# Patient Record
Sex: MALE | Race: WHITE | NOT HISPANIC OR LATINO | Employment: FULL TIME | URBAN - METROPOLITAN AREA
[De-identification: names, ages, dates, MRNs, and addresses within clinical notes are randomized per-mention and may not be internally consistent; named-entity substitution may affect disease eponyms.]

---

## 2017-03-16 ENCOUNTER — HOSPITAL ENCOUNTER (EMERGENCY)
Facility: MEDICAL CENTER | Age: 37
End: 2017-03-16
Attending: EMERGENCY MEDICINE
Payer: COMMERCIAL

## 2017-03-16 ENCOUNTER — APPOINTMENT (OUTPATIENT)
Dept: RADIOLOGY | Facility: MEDICAL CENTER | Age: 37
End: 2017-03-16
Attending: EMERGENCY MEDICINE
Payer: COMMERCIAL

## 2017-03-16 VITALS
HEART RATE: 77 BPM | HEIGHT: 69 IN | SYSTOLIC BLOOD PRESSURE: 132 MMHG | BODY MASS INDEX: 31.1 KG/M2 | WEIGHT: 210 LBS | RESPIRATION RATE: 10 BRPM | OXYGEN SATURATION: 97 % | DIASTOLIC BLOOD PRESSURE: 78 MMHG

## 2017-03-16 DIAGNOSIS — R55 VASOVAGAL SYNCOPE: ICD-10-CM

## 2017-03-16 LAB
ALBUMIN SERPL BCP-MCNC: 4.6 G/DL (ref 3.2–4.9)
ALBUMIN/GLOB SERPL: 1.5 G/DL
ALP SERPL-CCNC: 100 U/L (ref 30–99)
ALT SERPL-CCNC: 14 U/L (ref 2–50)
AMPHET UR QL SCN: NEGATIVE
ANION GAP SERPL CALC-SCNC: 12 MMOL/L (ref 0–11.9)
AST SERPL-CCNC: 14 U/L (ref 12–45)
BARBITURATES UR QL SCN: NEGATIVE
BASOPHILS # BLD AUTO: 0.6 % (ref 0–1.8)
BASOPHILS # BLD: 0.06 K/UL (ref 0–0.12)
BENZODIAZ UR QL SCN: NEGATIVE
BILIRUB SERPL-MCNC: 1.4 MG/DL (ref 0.1–1.5)
BNP SERPL-MCNC: 14 PG/ML (ref 0–100)
BUN SERPL-MCNC: 25 MG/DL (ref 8–22)
BZE UR QL SCN: NEGATIVE
CALCIUM SERPL-MCNC: 9.6 MG/DL (ref 8.5–10.5)
CANNABINOIDS UR QL SCN: POSITIVE
CHLORIDE SERPL-SCNC: 105 MMOL/L (ref 96–112)
CO2 SERPL-SCNC: 19 MMOL/L (ref 20–33)
CREAT SERPL-MCNC: 0.85 MG/DL (ref 0.5–1.4)
DEPRECATED D DIMER PPP IA-ACNC: <200 NG/ML(D-DU)
EOSINOPHIL # BLD AUTO: 0.16 K/UL (ref 0–0.51)
EOSINOPHIL NFR BLD: 1.7 % (ref 0–6.9)
ERYTHROCYTE [DISTWIDTH] IN BLOOD BY AUTOMATED COUNT: 38.6 FL (ref 35.9–50)
GFR SERPL CREATININE-BSD FRML MDRD: >60 ML/MIN/1.73 M 2
GLOBULIN SER CALC-MCNC: 3.1 G/DL (ref 1.9–3.5)
GLUCOSE SERPL-MCNC: 224 MG/DL (ref 65–99)
HCT VFR BLD AUTO: 42.5 % (ref 42–52)
HGB BLD-MCNC: 14.3 G/DL (ref 14–18)
IMM GRANULOCYTES # BLD AUTO: 0.04 K/UL (ref 0–0.11)
IMM GRANULOCYTES NFR BLD AUTO: 0.4 % (ref 0–0.9)
LYMPHOCYTES # BLD AUTO: 2.44 K/UL (ref 1–4.8)
LYMPHOCYTES NFR BLD: 26.1 % (ref 22–41)
MCH RBC QN AUTO: 28.7 PG (ref 27–33)
MCHC RBC AUTO-ENTMCNC: 33.6 G/DL (ref 33.7–35.3)
MCV RBC AUTO: 85.3 FL (ref 81.4–97.8)
MDMA UR QL SCN: NEGATIVE
METHADONE UR QL SCN: NEGATIVE
MONOCYTES # BLD AUTO: 0.46 K/UL (ref 0–0.85)
MONOCYTES NFR BLD AUTO: 4.9 % (ref 0–13.4)
NEUTROPHILS # BLD AUTO: 6.2 K/UL (ref 1.82–7.42)
NEUTROPHILS NFR BLD: 66.3 % (ref 44–72)
NRBC # BLD AUTO: 0 K/UL
NRBC BLD AUTO-RTO: 0 /100 WBC
OPIATES UR QL SCN: NEGATIVE
OXYCODONE UR QL SCN: NEGATIVE
PCP UR QL SCN: NEGATIVE
PLATELET # BLD AUTO: 222 K/UL (ref 164–446)
PMV BLD AUTO: 9.8 FL (ref 9–12.9)
POTASSIUM SERPL-SCNC: 3.4 MMOL/L (ref 3.6–5.5)
PROPOXYPH UR QL SCN: NEGATIVE
PROT SERPL-MCNC: 7.7 G/DL (ref 6–8.2)
RBC # BLD AUTO: 4.98 M/UL (ref 4.7–6.1)
SODIUM SERPL-SCNC: 136 MMOL/L (ref 135–145)
T4 FREE SERPL-MCNC: 1 NG/DL (ref 0.53–1.43)
TROPONIN I SERPL-MCNC: <0.01 NG/ML (ref 0–0.04)
WBC # BLD AUTO: 9.4 K/UL (ref 4.8–10.8)

## 2017-03-16 PROCEDURE — 93005 ELECTROCARDIOGRAM TRACING: CPT | Performed by: EMERGENCY MEDICINE

## 2017-03-16 PROCEDURE — 96360 HYDRATION IV INFUSION INIT: CPT

## 2017-03-16 PROCEDURE — 80053 COMPREHEN METABOLIC PANEL: CPT

## 2017-03-16 PROCEDURE — 700105 HCHG RX REV CODE 258: Performed by: EMERGENCY MEDICINE

## 2017-03-16 PROCEDURE — 304562 HCHG STAT O2 MASK/CANNULA

## 2017-03-16 PROCEDURE — 36415 COLL VENOUS BLD VENIPUNCTURE: CPT

## 2017-03-16 PROCEDURE — 96361 HYDRATE IV INFUSION ADD-ON: CPT

## 2017-03-16 PROCEDURE — 84439 ASSAY OF FREE THYROXINE: CPT

## 2017-03-16 PROCEDURE — 304561 HCHG STAT O2

## 2017-03-16 PROCEDURE — 71010 DX-CHEST-PORTABLE (1 VIEW): CPT

## 2017-03-16 PROCEDURE — 84484 ASSAY OF TROPONIN QUANT: CPT

## 2017-03-16 PROCEDURE — 85379 FIBRIN DEGRADATION QUANT: CPT

## 2017-03-16 PROCEDURE — 85025 COMPLETE CBC W/AUTO DIFF WBC: CPT

## 2017-03-16 PROCEDURE — 80307 DRUG TEST PRSMV CHEM ANLYZR: CPT

## 2017-03-16 PROCEDURE — 99284 EMERGENCY DEPT VISIT MOD MDM: CPT

## 2017-03-16 PROCEDURE — 83880 ASSAY OF NATRIURETIC PEPTIDE: CPT

## 2017-03-16 RX ORDER — SODIUM CHLORIDE 9 MG/ML
1000 INJECTION, SOLUTION INTRAVENOUS ONCE
Status: COMPLETED | OUTPATIENT
Start: 2017-03-16 | End: 2017-03-16

## 2017-03-16 RX ORDER — SODIUM CHLORIDE 9 MG/ML
INJECTION, SOLUTION INTRAVENOUS ONCE
Status: COMPLETED | OUTPATIENT
Start: 2017-03-16 | End: 2017-03-16

## 2017-03-16 RX ORDER — ONDANSETRON 4 MG/1
4 TABLET, FILM COATED ORAL EVERY 4 HOURS PRN
Qty: 10 TAB | Refills: 0 | Status: SHIPPED | OUTPATIENT
Start: 2017-03-16

## 2017-03-16 RX ADMIN — SODIUM CHLORIDE 1000 ML: 9 INJECTION, SOLUTION INTRAVENOUS at 02:08

## 2017-03-16 RX ADMIN — SODIUM CHLORIDE: 9 INJECTION, SOLUTION INTRAVENOUS at 05:17

## 2017-03-16 ASSESSMENT — PAIN SCALES - GENERAL: PAINLEVEL_OUTOF10: 0

## 2017-03-16 NOTE — CONSULTS
"Cardiology New Consult Note    Date of note:    3/16/2017      Consulting Physician: Kelly Ralph MD    Patient ID:    Name:   Blanco López     YOB: 1980  Age:   37 y.o.  male   MRN:   [id number]      Consult question: Evaluation of presyncope, and sinus bradycardia prompting the use of atropine by emergency medical service    HPI:  Blanco López is a 37-year-old man with a family history of coronary artery disease, and medical marijuana use with no other medical history who presents brought in by ambulance after an event that he characterizes as presyncope though emergency medical services related was syncope. He tells me that he is quite clear that he remembers all details prior to his admission and did not pass out. He came nauseated on the plane, intensely diaphoretic, and after that had his event. He has had similar events in the past and tells me that he is intensely diaphoretic with them. He was relatively dehydrated in the setting of quite a bit of snowboarding in [place] where he lives. For tonight, cardiovascular review systems is negative.    ROS: Negative    PMH (problem list reviewed, pertinent to this consultation):     1. Family history of coronary artery disease as above    Outpatient Medications:     None    Allergies: Review of patient's allergies indicates no known allergies.    Family History: family history includes Heart Disease (age of onset: 45) in his father.    Social History:  reports that he has never smoked. He does not have any smokeless tobacco history on file. He reports that he uses illicit drugs (Inhaled). He reports that he does not drink alcohol.    Physical Exam    Body mass index is 31 kg/(m^2).  Blood pressure 132/78, pulse 77, resp. rate 15, height 1.753 m (5' 9.02\"), weight 95.255 kg (210 lb), SpO2 96 %.  Filed Vitals:    03/16/17 0446 03/16/17 0447 03/16/17 0450 03/16/17 0500   BP:       Pulse:       Resp:  18 16 15   Height:       Weight:       SpO2: 97% 98% 96% " 96%     Oxygen Therapy:  Pulse Oximetry: 96 %, O2 (LPM): 2    General: Middle-aged appearing man in no distress in the emergency room  HEENT: No jugular venous distension sitting upright, pupils equal, round and reactive to light, extraocular movements intact  Heart: Normal rate, regular rhythm, no murmurs, no rubs or gallops  Lungs: Clear to auscultation bilaterally  Abdomen: Bowel sounds positive, non tender, non distended, no masses   Extremities: No lower extremity edema, no clubbing, no cyanosis  Neurological: Alert and oriented x 3, no focal deficit. Narcissistic personality traits.  Skin: no rashes    Labs (reviewed and notable for):     CBC, 3/16/2017 unremarkable  Chemistry panel, 3/16/2017 remarkable for blood sugar of 224, normal creatinine, mild hypokalemia  Cardiac markers: Troponin was negative ×1 (less than 0.01 ng/ML), B-type natriuretic peptide 14 pg/mL    EKG: Sinus rhythm, no conduction system disease. His first EKG showed a QTC of 490 ms, a second showed a QTC of 459 ms      Impression and Medical Decision Makin. Neurocardiogenic presyncope: I informed the patient to push his hydration drinking 8 large glasses of water or more per day, and increase his salt intake. I don't suspect that her another episode will recur. I do not think that he needs additional workup at this time. I discussed with him other treatment modalities including compression garments, midodrine, Florinef, and SSRIs none of which again I would recommend at this time. I think it would be wise to finish out his liter of normal saline prior to discharge.  I do think that it would be wise to send him home with a prescription for Zofran for the treatment of his nausea for his flight back is an airline travel seems to be fairly predictable recipient end, and he did after all come here to snowboard, which caused him to be dehydrated in the past and precipitated his present event.    2. Sinus bradycardia: Treated with atropine,  secondary to #1. No conduction systems disease seen on his EKG.    No additional recommendations, will sign off.      This note was dictated using Dragon speech recognition software.    Thank you for allowing me to participate in the care of this patient.  Please call if any clarification will be helpful.      Aureliano Wheat MD  Cardiologist, [facility] [place]

## 2017-03-16 NOTE — ED AVS SNAPSHOT
LimeTray Access Code: [email or web address]  Expires: 4/15/2017  4:09 AM    Your email address is not on file at [facility].  Email Addresses are required for you to sign up for LimeTray, please contact [phone] to verify your personal information and to provide your email address prior to attempting to register for LimeTray.    Blanco López  [address] [place] [address]    LimeTray  A secure, online tool to manage your health information     [facility]’s LimeTray® is a secure, online tool that connects you to your personalized health information from the privacy of your home -- day or night - making it very easy for you to manage your healthcare. Once the activation process is completed, you can even access your medical information using the LimeTray rani, which is available for free in the Apple Rani store or Google Play store.     To learn more about LimeTray, visit [email or web address]    There are two levels of access available (as shown below):   My Chart Features  [facility] Primary Care Doctor [facility]  Specialists [facility]  Urgent  Care Non-[facility] Primary Care Doctor   Email your healthcare team securely and privately 24/7 X X X    Manage appointments: schedule your next appointment; view details of past/upcoming appointments X      Request prescription refills. X      View recent personal medical records, including lab and immunizations X X X X   View health record, including health history, allergies, medications X X X X   Read reports about your outpatient visits, procedures, consult and ER notes X X X X   See your discharge summary, which is a recap of your hospital and/or ER visit that includes your diagnosis, lab results, and care plan X X  X     How to register for [employer]:  Once your e-mail address has been verified, follow the following steps to sign up for [employer].     1. Go to  [email or web address]  2. Click on the Sign Up Now box, which takes you to the New Member Sign Up page. You will  need to provide the following information:  a. Enter your [employer] Access Code exactly as it appears at the top of this page. (You will not need to use this code after you’ve completed the sign-up process. If you do not sign up before the expiration date, you must request a new code.)   b. Enter your date of birth.   c. Enter your home email address.   d. Click Submit, and follow the next screen’s instructions.  3. Create a Guan[employer]met ID. This will be your [employer] login ID and cannot be changed, so think of one that is secure and easy to remember.  4. Create a [employer] password. You can change your password at any time.  5. Enter your Password Reset Question and Answer. This can be used at a later time if you forget your password.   6. Enter your e-mail address. This allows you to receive e-mail notifications when new information is available in [employer].  7. Click Sign Up. You can now view your health information.    For assistance activating your [employer] account, call [phone]

## 2017-03-16 NOTE — ED AVS SNAPSHOT
Home Care Instructions                                                                                                                Blanco López   MRN: [id number]    Department:  [facility], Emergency Dept   Date of Visit:  3/16/2017            [facility], Emergency Dept    [address] [place] [id number]    Phone:  [phone]      You were seen by     Kelly Ralph M.D.      Your Diagnosis Was     Vasovagal syncope     R55       These are the medications you received during your hospitalization from 03/16/2017 0135 to 03/16/2017 0409     Date/Time Order Dose Route Action    03/16/2017 0208 NS infusion 1,000 mL 1,000 mL Intravenous New Bag      Follow-up Information     1. Follow up with [facility], Emergency Dept.    Specialty:  Emergency Medicine    Why:  If symptoms worsen    Contact information    [address] [place] [id number] [phone]        2. Please follow up.    Why:  Please follow up with your primary care physician and/or cardiologist for recheck.      Medication Information     Review all of your home medications and newly ordered medications with your primary doctor and/or pharmacist as soon as possible. Follow medication instructions as directed by your doctor and/or pharmacist.     Please keep your complete medication list with you and share with your physician. Update the information when medications are discontinued, doses are changed, or new medications (including over-the-counter products) are added; and carry medication information at all times in the event of emergency situations.               Medication List      Notice     You have not been prescribed any medications.            Procedures and tests performed during your visit     BTYPE NATRIURETIC PEPTIDE    CBC WITH DIFFERENTIAL    COMP METABOLIC PANEL    Cardiac Monitoring    D-DIMER (only helpful in low pre-test probability wells critieria. Do not order  if patient ruled out by PERC criteria. See Weblinks at top of Labs section)    DX-CHEST-PORTABLE (1 VIEW)    EKG (ER)    ESTIMATED GFR    FREE THYROXINE    IV Saline Lock    Pulse Ox    TROPONIN        Discharge Instructions       Please follow-up with your cardiologist when you return home to [place]. Please return to the emergency department if he develops any new or worsening symptoms, if you develop any other lightheadedness, or if you have any further concerns.      Near-Syncope  Near-syncope (commonly known as near fainting) is sudden weakness, dizziness, or feeling like you might pass out. During an episode of near-syncope, you may also develop pale skin, have tunnel vision, or feel sick to your stomach (nauseous). Near-syncope may occur when getting up after sitting or while standing for a long time. It is caused by a sudden decrease in blood flow to the brain. This decrease can result from various causes or triggers, most of which are not serious. However, because near-syncope can sometimes be a sign of something serious, a medical evaluation is required. The specific cause is often not determined.  HOME CARE INSTRUCTIONS   Monitor your condition for any changes. The following actions may help to alleviate any discomfort you are experiencing:  · Have someone stay with you until you feel stable.  · Lie down right away and prop your feet up if you start feeling like you might faint. Breathe deeply and steadily. Wait until all the symptoms have passed. Most of these episodes last only a few minutes. You may feel tired for several hours.    · Drink enough fluids to keep your urine clear or pale yellow.    · If you are taking blood pressure or heart medicine, get up slowly when seated or lying down. Take several minutes to sit and then stand. This can reduce dizziness.  · Follow up with your health care provider as directed.   SEEK IMMEDIATE MEDICAL CARE IF:   · You have a severe headache.    · You have unusual  pain in the chest, abdomen, or back.    · You are bleeding from the mouth or rectum, or you have black or tarry stool.    · You have an irregular or very fast heartbeat.    · You have repeated fainting or have seizure-like jerking during an episode.    · You faint when sitting or lying down.    · You have confusion.    · You have difficulty walking.    · You have severe weakness.    · You have vision problems.    MAKE SURE YOU:   · Understand these instructions.  · Will watch your condition.  · Will get help right away if you are not doing well or get worse.     This information is not intended to replace advice given to you by your health care provider. Make sure you discuss any questions you have with your health care provider.     Document Released: 12/18/2006 Document Revised: 12/23/2014 Document Reviewed: 05/23/2014  [employer] Interactive Patient Education ©2016 [employer]            Patient Information     Patient Information    Following emergency treatment: all patient requiring follow-up care must return either to a private physician or a clinic if your condition worsens before you are able to obtain further medical attention, please return to the emergency room.     Billing Information    At [facility], we work to make the billing process streamlined for our patients.  Our Representatives are here to answer any questions you may have regarding your hospital bill.  If you have insurance coverage and have supplied your insurance information to us, we will submit a claim to your insurer on your behalf.  Should you have any questions regarding your bill, we can be reached online or by phone as follows:  Online: You are able pay your bills online or live chat with our representatives about any billing questions you may have. We are here to help Monday - Friday from 8:00am to 7:30pm and 9:00am - 12:00pm on Saturdays.  Please visit [email or web address]  for more information.    Phone:  [phone] or [phone]    Please note that your emergency physician, surgeon, pathologist, radiologist, anesthesiologist, and other specialists are not employed by [facility] and will therefore bill separately for their services.  Please contact them directly for any questions concerning their bills at the numbers below:     Emergency Physician Services:  [phone]  [facility]:  [phone]  [facility]:  [phone]  [facility]:  [phone]    1. Your final bill may vary from the amount quoted upon discharge if all procedures are not complete at that time, or if your doctor has additional procedures of which we are not aware. You will receive an additional bill if you return to the Emergency Department at [facility] hospital for suture removal regardless of the facility of which the sutures were placed.     2. Please arrange for settlement of this account at the emergency registration.    3. All self-pay accounts are due in full at the time of treatment.  If you are unable to meet this obligation then payment is expected within 4-5 days.     4. If you have had radiology studies (CT, X-ray, Ultrasound, MRI), you have received a preliminary result during your emergency department visit. Please contact the radiology department [phone] to receive a copy of your final result. Please discuss the Final result with your primary physician or with the follow up physician provided.     Crisis Hotline:  [place] Crisis Hotline:  7-173-GSNKEWG or 1-428.590.3346  [place] Crisis Hotline:    1-456.717.7764 or 867-958-6062         ED Discharge Follow Up Questions    1. In order to provide you with very good care, we would like to follow up with a phone call in the next few days.  May we have your permission to contact you?     YES /  NO    2. What is the best phone number to call you? (       )_____-__________    3. What is the best time to call you?       Morning  /  Afternoon  /  Evening                   Patient Signature:  ____________________________________________________________    Date:  ____________________________________________________________

## 2017-03-16 NOTE — ED NOTES
Late entry:  Pt states he felt light headed when standing and complains of a continued headache.  Orthostatics complete, ERP notified.  Pt evaluated by Cardiology.

## 2017-03-16 NOTE — ED NOTES
Pt discharged home. Explained discharge and medication instruction. Pt verbalized understanding of instructions. Pt is ambulating well and is steady on feet. VS stable.

## 2017-03-16 NOTE — ED PROVIDER NOTES
ED Provider Note    Scribed for Kelly Ralph M.D. by Darlene Mistry. 3/16/2017, 1:51 AM.    Primary care provider: No primary care provider on file.  Means of arrival: Ambulance   History obtained from: Patient   History limited by: None     CHIEF COMPLAINT  Chief Complaint   Patient presents with   • N/V   • Bradycardia       HPI  Blanco López is a 37 y.o. male who presents to the Emergency Department due to nausea and vomiting onset earlier tonight. Patient was on a direct flight from [place] when he became diaphoretic and began feeling nauseated and vomited during the landing. He then had a syncopal episode while on the plane. Patient states he lost consciousness and regained consciousness while in the ambulance. He denies chest pain or shortness of breath. Patient endorses feeling drowsy secondary to snowboarding prior to leaving [place]. He notes using medical marijuana regularly but denies further drug use. Patient has a history of anxiety.     Other history obtained by paramedics who arrived on scene. States that he had 2 syncopal episodes and markedly lightheadedness between these episodes. Heart rate was as low as 37, they did give 0.5 mg of atropine with good heart rate response. He has remained normotensive, with normal heart rate since that time.    REVIEW OF SYSTEMS  Pertinent positives include nausea, vomiting, diaphoresis, syncope, loss of consciousness. Pertinent negatives include no chest pain, shortness of breath.  All other systems reviewed and negative. C     PAST MEDICAL HISTORY   has a past medical history of Anxiety.    SURGICAL HISTORY  patient denies any surgical history    SOCIAL HISTORY  Social History   Substance Use Topics   • Smoking status: Never Smoker    • Smokeless tobacco: None   • Alcohol Use: No      History   Drug Use   • Yes   • Special: Inhaled     Comment: medical marijuana for anxiety       FAMILY HISTORY  Family History   Problem Relation Age of Onset   • Heart Disease  "Father 45     MI       CURRENT MEDICATIONS  Home Medications     Reviewed by Aranza Triplett R.N. (Registered Nurse) on 03/16/17 at 0144  Med List Status: Complete    Medication Last Dose Status          Patient Laith Taking any Medications                        ALLERGIES  No Known Allergies    PHYSICAL EXAM  Vital Signs: /78 mmHg  Pulse 77  Resp 18  Ht 1.753 m (5' 9.02\")  Wt 95.255 kg (210 lb)  BMI 31.00 kg/m2  SpO2 99%  Constitutional: Alert, no acute distress  HENT: Normocephalic, atraumatic, moist mucus membranes. Drowsy.   Eyes: Pupils equal and reactive, normal conjunctiva, non-icteric  Neck: Supple, normal range of motion, no stridor  Cardiovascular: Regular rhythm, Normal peripheral perfusion, no cyanosis, Normal cardiac auscultation  Pulmonary: No respiratory distress, normal work of breathing, no accessory muscle usage, Clear to auscultation bilaterally  Abdomen: Soft, non tender, no peritoneal signs, bowel sounds are present.   Skin: Warm, dry, no rashes or lesions  Back: No pain with active range of motion  Musculoskeletal: Normal range of motion in all extremities, no swelling or deformity noted  Neurologic: Alert, oriented, normal motor function, no speech deficits  Psychiatric: Normal and appropriate mood and affect    LABS  Labs Reviewed   CBC WITH DIFFERENTIAL - Abnormal; Notable for the following:     MCHC 33.6 (*)     All other components within normal limits    Narrative:     1. Age less then 50  2. Heart reate less then 100  3. 02 sat > 94%  4. No prior history of DVT or PE  5. No recent trauma or surgery (2 weeks)  6. No hemoptisis.  7. No  or HRP  8. No un-lateral leg swelling.   COMP METABOLIC PANEL - Abnormal; Notable for the following:     Potassium 3.4 (*)     Co2 19 (*)     Anion Gap 12.0 (*)     Glucose 224 (*)     Bun 25 (*)     Alkaline Phosphatase 100 (*)     All other components within normal limits    Narrative:     1. Age less then 50  2. Heart reate less then " 100  3. 02 sat > 94%  4. No prior history of DVT or PE  5. No recent trauma or surgery (2 weeks)  6. No hemoptisis.  7. No  or HRP  8. No un-lateral leg swelling.   URINE DRUG SCREEN - Abnormal; Notable for the following:     Cannabinoid Metab Positive (*)     All other components within normal limits   TROPONIN    Narrative:     1. Age less then 50  2. Heart reate less then 100  3. 02 sat > 94%  4. No prior history of DVT or PE  5. No recent trauma or surgery (2 weeks)  6. No hemoptisis.  7. No  or HRP  8. No un-lateral leg swelling.   BTYPE NATRIURETIC PEPTIDE    Narrative:     1. Age less then 50  2. Heart reate less then 100  3. 02 sat > 94%  4. No prior history of DVT or PE  5. No recent trauma or surgery (2 weeks)  6. No hemoptisis.  7. No  or HRP  8. No un-lateral leg swelling.   FREE THYROXINE    Narrative:     1. Age less then 50  2. Heart reate less then 100  3. 02 sat > 94%  4. No prior history of DVT or PE  5. No recent trauma or surgery (2 weeks)  6. No hemoptisis.  7. No  or HRP  8. No un-lateral leg swelling.   D-DIMER    Narrative:     1. Age less then 50  2. Heart reate less then 100  3. 02 sat > 94%  4. No prior history of DVT or PE  5. No recent trauma or surgery (2 weeks)  6. No hemoptisis.  7. No  or HRP  8. No un-lateral leg swelling.   ESTIMATED GFR    Narrative:     1. Age less then 50  2. Heart reate less then 100  3. 02 sat > 94%  4. No prior history of DVT or PE  5. No recent trauma or surgery (2 weeks)  6. No hemoptisis.  7. No  or HRP  8. No un-lateral leg swelling.   All labs reviewed by me.    EKG  12 Lead EKG interpreted by me to show:    Rate 67, normal sinus rhythm, , no ST elevation or depression, no T-wave inversions  Repeat EKG rate 63, sinus rhythm, no ST elevation or depression, , no ectopy    Radiology results revealed:   DX-CHEST-PORTABLE (1 VIEW)   Final Result      No acute cardiopulmonary abnormality identified.           COURSE & MEDICAL  DECISION MAKING  Pertinent Labs & Imaging studies reviewed. (See chart for details)    Review of old medical records for continuity of care. No medical records available, patient is from [place]    Differential diagnoses include but are not limited to: Cardiac arrhythmia, vasovagal episode, orthostatic hypotension, recreational drug use, recreational drug side effect    1:51 AM - Patient seen and examined at bedside. Patient will be treated with IV fluids. Ordered chest x-ray, urine drug screen, free thyroxine, d-dimer, CBC with differential, CMP, troponin, BNP, and EKG to evaluate his symptoms.     1:57 AM Per EMS, patient's lowest heart rate pre hospital was 37, and he responded to 0.5 mg atropine.       Decision Making:  This is a 37 y.o. year old male who presents with 2-3 syncopal episodes at the end of a cross-country flight. He has no leg pain, no leg swelling. No previous history of DVT. He is PERC negative. D-dimer used for further risk stratification, this is negative, doubt CTA is indicated. He has not had chest pain, no shortness of breath. He did have diaphoresis per EMS report. On arrival to the emergency department he is asymptomatic, appears drowsy but states it is because he has been flying all night and because he had a heavy workout snowboarding yesterday.    Laboratory evaluation is relatively unremarkable. White blood count is 9.4 within normal limits, no evidence of infectious etiology. CMP with glucose 224, normal anion gap, suspect stress response. Troponin is undetectable, BNP is 14. D-dimer undetectable. Chest x-ray negative for acute process.    EKG EKG without evidence of ischemia. Patient with no family history of sudden cardiac death. I did discuss the case with Dr. Wheat who kindly agreed to evaluate the patient in the emergency department. His recommendation is for discharge home with outpatient primary care follow-up if any symptoms recur. We see his consult note for full  evaluation, he suspects this is likely neurocardiogenic syncopal episode. On reassessment patient is asymptomatic, he has received 2 L of fluid, is not orthostatic, has not had any episodes of chest pain preceding his episode of syncope, nor to this point in the emergency department. He is from [place], will follow up with his primary care physician or cardiologist when he returns home. He agrees to return to the emergency department immediately if he develops any new or worsening symptoms.    Discharge home in stable condition    FINAL IMPRESSION  1. Vasovagal syncope          Darlene URIOSTEGUI (Scribaleks), am scribing for, and in the presence of, Kelly Ralph M.D..    Electronically signed by: Darlene Mistry (Dai), 3/16/2017    Kelly URIOSTEGUI M.D. personally performed the services described in this documentation, as scribed by Darlene Mistry in my presence, and it is both accurate and complete.    The note accurately reflects work and decisions made by me.  Kelly Ralph  3/16/2017  5:45 AM

## 2017-03-16 NOTE — ED AVS SNAPSHOT
3/16/2017          Blanco Whiteley  [address]  [place] [address]    Dear Blanco:    [facility] wants to ensure your discharge home is safe and you or your loved ones have had all your questions answered regarding your care after you leave the hospital.    You may receive a telephone call within two days of your discharge.  This call is to make certain you understand your discharge instructions as well as ensure we provided you with the best care possible during your stay with us.     The call will only last approximately 3-5 minutes and will be done by a nurse.    Once again, we want to ensure your discharge home is safe and that you have a clear understanding of any next steps in your care.  If you have any questions or concerns, please do not hesitate to contact us, we are here for you.  Thank you for choosing [facility] for your healthcare needs.    Sincerely,    Ralf Huff    [facility]

## 2017-03-16 NOTE — ED NOTES
Chief Complaint   Patient presents with   • N/V   • Bradycardia     Pt BIB EMS from airport.  [place], he states symptoms started on the airplane as they were landing.  Pt states he became diaphoretic and had n/v, and did have a syncopal event.  Pt denies CP SOB at this time although he did states he had some intermittent SOB prior.  EMS found pt to have heart rate in the 30's and gave the pt 0.5 mg of atropine.  He was also given 300 cc NS and 4 mg zofran PTA.  Pt denies any history, states he uses medical marijuana only.  Pt's VSS upon arrival, negative 12 lead for EMS.  Pt is alert and oriented at this time.  VSS.  Labs drawn and sent.  Chart up for eval.

## 2017-03-16 NOTE — DISCHARGE INSTRUCTIONS
Please follow-up with your cardiologist when you return home to [place]. Please return to the emergency department if he develops any new or worsening symptoms, if you develop any other lightheadedness, or if you have any further concerns.      Near-Syncope  Near-syncope (commonly known as near fainting) is sudden weakness, dizziness, or feeling like you might pass out. During an episode of near-syncope, you may also develop pale skin, have tunnel vision, or feel sick to your stomach (nauseous). Near-syncope may occur when getting up after sitting or while standing for a long time. It is caused by a sudden decrease in blood flow to the brain. This decrease can result from various causes or triggers, most of which are not serious. However, because near-syncope can sometimes be a sign of something serious, a medical evaluation is required. The specific cause is often not determined.  HOME CARE INSTRUCTIONS   Monitor your condition for any changes. The following actions may help to alleviate any discomfort you are experiencing:  · Have someone stay with you until you feel stable.  · Lie down right away and prop your feet up if you start feeling like you might faint. Breathe deeply and steadily. Wait until all the symptoms have passed. Most of these episodes last only a few minutes. You may feel tired for several hours.    · Drink enough fluids to keep your urine clear or pale yellow.    · If you are taking blood pressure or heart medicine, get up slowly when seated or lying down. Take several minutes to sit and then stand. This can reduce dizziness.  · Follow up with your health care provider as directed.   SEEK IMMEDIATE MEDICAL CARE IF:   · You have a severe headache.    · You have unusual pain in the chest, abdomen, or back.    · You are bleeding from the mouth or rectum, or you have black or tarry stool.    · You have an irregular or very fast heartbeat.    · You have repeated fainting or have seizure-like jerking  during an episode.    · You faint when sitting or lying down.    · You have confusion.    · You have difficulty walking.    · You have severe weakness.    · You have vision problems.    MAKE SURE YOU:   · Understand these instructions.  · Will watch your condition.  · Will get help right away if you are not doing well or get worse.     This information is not intended to replace advice given to you by your health care provider. Make sure you discuss any questions you have with your health care provider.     Document Released: 12/18/2006 Document Revised: 12/23/2014 Document Reviewed: 05/23/2014  [employer] Patient Education ©2016 [employer]

## 2017-04-26 LAB
EKG IMPRESSION: NORMAL
EKG IMPRESSION: NORMAL